# Patient Record
Sex: MALE
[De-identification: names, ages, dates, MRNs, and addresses within clinical notes are randomized per-mention and may not be internally consistent; named-entity substitution may affect disease eponyms.]

---

## 2021-11-02 PROBLEM — Z00.00 ENCOUNTER FOR PREVENTIVE HEALTH EXAMINATION: Status: ACTIVE | Noted: 2021-11-02

## 2021-11-04 ENCOUNTER — APPOINTMENT (OUTPATIENT)
Dept: ORTHOPEDIC SURGERY | Facility: CLINIC | Age: 56
End: 2021-11-04
Payer: COMMERCIAL

## 2021-11-04 VITALS — WEIGHT: 205 LBS | BODY MASS INDEX: 32.95 KG/M2 | HEIGHT: 66 IN

## 2021-11-04 DIAGNOSIS — M25.522 PAIN IN RIGHT ELBOW: ICD-10-CM

## 2021-11-04 DIAGNOSIS — M25.521 PAIN IN RIGHT ELBOW: ICD-10-CM

## 2021-11-04 DIAGNOSIS — M77.8 OTHER ENTHESOPATHIES, NOT ELSEWHERE CLASSIFIED: ICD-10-CM

## 2021-11-04 DIAGNOSIS — M77.10 LATERAL EPICONDYLITIS, UNSPECIFIED ELBOW: ICD-10-CM

## 2021-11-04 PROCEDURE — 73070 X-RAY EXAM OF ELBOW: CPT | Mod: 50

## 2021-11-04 PROCEDURE — 99204 OFFICE O/P NEW MOD 45 MIN: CPT

## 2021-11-04 RX ORDER — NABUMETONE 500 MG/1
500 TABLET, FILM COATED ORAL
Qty: 30 | Refills: 2 | Status: ACTIVE | COMMUNITY
Start: 2021-11-04 | End: 1900-01-01

## 2021-11-04 NOTE — ASSESSMENT
[FreeTextEntry1] : Discussed at length with patient exam history and imaging as well as treatment options.  Discussed on the right counterforce bracing which was given as well his home exercises and avoid lifting with a pronated position.  In regards to the left discussed triceps tendinitis and avoidance of deep triceps extension as well as dips.  Prescription anti-inflammatory given.  Discussed the both of these entities can take several months to resolve and persistent discomfort consideration to injection on the right but not on the left given the nature of triceps tendinitis.  Patient agrees with plan
No

## 2021-11-04 NOTE — HISTORY OF PRESENT ILLNESS
[de-identified] : Location: Bilateral elbows (posterior - left, lateral - right)\par Duration: 4 months\par Context: atraumatic \par Quality: sharp, dull \par Aggravating factors: lifting heavy weights\par Conservative treatment: rest\par Prior studies: N/A

## 2021-11-04 NOTE — PHYSICAL EXAM
[de-identified] : Right elbow\par Constitutional: \par The patient is healthy-appearing and in no apparent distress. \par \par Cardiovascular System: \par There is capillary refill less than 2 seconds. \par \par Skin: \par There is no skin abnormalities of elbow.\par \par Right Elbow: \par Appearance: \par There is no deformity, induration, redness, swelling, or warmth and a normal carrying angle. \par \par Bony Palpation: \par There is no tenderness of the medial epicondyle.\par There is no tenderness of olecranon.\par There is no tenderness of the ulnatrochlea articulation.\par There is no tenderness of the coronoid process.\par There is no tenderness of the radial head.\par There is no tenderness of the radiocapitellar joint.\par There is tenderness of the lateral epicondyle. \par \par Soft Tissue Palpation: \par There is no tenderness of the ulnar nerve.\par There is no tenderness of the biceps insertion.\par There is no tenderness of the pronator teres.\par There is no tenderness of the flexor carpi ulnaris.\par There is no tenderness of the flexor carpi radialis.\par There is no tenderness of the annular ligament of the radius.\par There is no tenderness of the brachioradialis.\par There is no tenderness of the radial collateral ligament.\par There is no tenderness of the ulnar collateral ligament.\par There is no tenderness of the antecubital fossa.\par There is tenderness of the extensor carpi radialis brevis.\par There is tenderness of the extensor carpi radialis longus.\par \par Range of Motion:  \par There is full range of motion both actively and passively. \par \par Stability:\par There is no dislocation or laxity to testing.\par  \par Strength: \par There is 5/5 elbow flexion, extension, supination and pronation.  \par \par Neurologic:\par There is normal sensation C5-T1 to light touch. \par \par Left Elbow: \par Appearance: \par There is no deformity, induration, redness, swelling, or warmth and a normal carrying angle. \par \par Bony Palpation: \par There is no tenderness of the medial epicondyle.\par There is no tenderness of olecranon.\par There is no tenderness of the ulnatrochlea articulation.\par There is no tenderness of the coronoid process.\par There is no tenderness of the radial head.\par There is no tenderness of the radiocapitellar joint.\par There is no tenderness of the lateral epicondyle. \par \par Soft Tissue Palpation: \par There is no tenderness of the ulnar nerve.\par There is no tenderness of the biceps insertion.\par There is no tenderness of the pronator teres.\par There is no tenderness of the flexor carpi ulnaris.\par There is no tenderness of the flexor carpi radialis.\par There is no tenderness of the annular ligament of the radius.\par There is no tenderness of the brachioradialis.\par There is no tenderness of the radial collateral ligament.\par There is no tenderness of the ulnar collateral ligament.\par There is no tenderness of the antecubital fossa.\par There is mild tenderness of the triceps insertion.\par There is no tenderness of the extensor carpi radialis brevis.\par There is no tenderness of the extensor carpi radialis longus.\par \par Range of Motion:  \par There is full range of motion both actively and passively. \par \par Stability:\par There is no dislocation or laxity to testing.\par  \par Strength: \par There is 5/5 elbow flexion, extension, supination and pronation.  \par \par Neurologic:\par There is normal sensation C5-T1 to light touch. \par \par Psychiatric: \par The patient demonstrates a normal mood and affect and is active and alert.\par  [de-identified] : Given patient's reported history and physical examination, x-ray evaluation ( as listed below ) was ordered and performed to aid in diagnosis and treatment of the patient.\par X-ray bilateral elbow.  There is no significant bony / soft tissue abnormality, arthritis, or fracture.\par \par

## 2022-04-13 ENCOUNTER — APPOINTMENT (OUTPATIENT)
Dept: ORTHOPEDIC SURGERY | Facility: CLINIC | Age: 57
End: 2022-04-13

## 2024-06-27 ENCOUNTER — APPOINTMENT (OUTPATIENT)
Dept: ORTHOPEDIC SURGERY | Facility: CLINIC | Age: 59
End: 2024-06-27

## 2024-06-27 DIAGNOSIS — S46.002A UNSPECIFIED INJURY OF MUSCLE(S) AND TENDON(S) OF THE ROTATOR CUFF OF LEFT SHOULDER, INITIAL ENCOUNTER: ICD-10-CM

## 2024-06-27 DIAGNOSIS — M75.42 IMPINGEMENT SYNDROME OF LEFT SHOULDER: ICD-10-CM

## 2024-06-27 PROCEDURE — 20611 DRAIN/INJ JOINT/BURSA W/US: CPT | Mod: LT

## 2024-06-27 PROCEDURE — 99214 OFFICE O/P EST MOD 30 MIN: CPT | Mod: 25

## 2024-06-27 PROCEDURE — 73030 X-RAY EXAM OF SHOULDER: CPT | Mod: LT

## 2024-08-14 ENCOUNTER — APPOINTMENT (OUTPATIENT)
Dept: ORTHOPEDIC SURGERY | Facility: CLINIC | Age: 59
End: 2024-08-14
Payer: COMMERCIAL

## 2024-08-14 DIAGNOSIS — M75.42 IMPINGEMENT SYNDROME OF LEFT SHOULDER: ICD-10-CM

## 2024-08-14 DIAGNOSIS — M12.812 OTHER SPECIFIC ARTHROPATHIES, NOT ELSEWHERE CLASSIFIED, LEFT SHOULDER: ICD-10-CM

## 2024-08-14 DIAGNOSIS — M75.112 INCOMPLETE ROTATOR CUFF TEAR OR RUPTURE OF LEFT SHOULDER, NOT SPECIFIED AS TRAUMATIC: ICD-10-CM

## 2024-08-14 PROCEDURE — 99213 OFFICE O/P EST LOW 20 MIN: CPT

## 2024-08-14 NOTE — HISTORY OF PRESENT ILLNESS
[de-identified] : TELEHEALTH VISIT FOR LEFT SHOUDLER PAIN SINCE JANUARY 2024  KENALOG INJECTION JUNE 2024 - SOME RELIEF - STILL HAS NIGHT PAIN   LOCATION: LEFT SHOULDER PAIN -RHD  DURATION: PAIN STARTED JANUARY 2024 -  5 MONTHS AGO -  NO SPECIFIC INJURY - MAYBE  GYM  QUALITY: SHARP WITH USE , DULL AT NIGHT LAYING ON IT  INTERMITTENT / LOCALIZED  LATERAL SHOUDLER   PAIN LEVEL:3 - 8 /10  TREATMENTS: PATIENT HAS TRIED RESTING AGGRAVATING FACTORS: PAIN WORSENS WITH SHOWERING, OVER HEAD LIFITNG  PAIN WORSE DURING THE  AT NIGHT / SLEEPING PT UNDERWENT SURGERY WITH DR. PYLE IN THE PAST -  2000s

## 2024-08-14 NOTE — DISCUSSION/SUMMARY
[de-identified] : PREOP SHOULDER SURGERY DISCUSSION:  PROCEDURE DISCUSSED - QUESTIONS ANSWERED PATIENT WISHES TO PROCEED  POST OP CARE AND LIMITATIONS REVIEWED - HANDOUT PROVIDED   COLD PACKS RECOMMENDED ANALGESICS AND  ANTI NAUSEA MEDS PRESCRIBED  COLACE RECOMMENDED   THERE ARE NO GUARANTEES THAT ALL SYMPTOMS WILL BE ALLEVIATED   SHOULDER ARTHROSCOPY, ACROMIOPLASTY, DEBRIDEMENT,  RC REPAIR AND LABRUM REPAIRS- ON AVERAGE 75- 85% SATISFACTORY RESULTS FOR TEARS < 3CM AFTER 9-12 MONTHS HEALING AND REHABILITATION.   REPAIRS WILL REQUIRE STRICT SHOULDER IMMOBILIZER 4-6 WEEKS  RC TEARS 3CM OR LARGER MAY REQUIRE COLLAGEN PATCH AUGMENTATION GENERALLY HAVE LESS SATISFACTORY RESULTS  PHYSICAL THERAPY REQUIRED 2X WEEK FOR  MINIMUM 8-12 WEEKS FOR ALL PROCEDURES  CONTINUED HOME EXERCISES 6-9 MONTHS AFTER THAT REQUIRED FOR OPTIMAL OUTCOMES   ROUTINE SURGICAL AND ANESTHETIC RISKS INCLUDE RISK OF SURGICAL INFECTION, ANESTHETIC COMPLICATION OR ALLERGY, POSSIBLE RETEARS OR PROGRESSION OF TEAR, STIFFNESS OF SHOULDER AND UNSATISFACTORY OUTCOMES  PATIENT UNDERSTANDS AND WISHES TO PROCEED

## 2024-08-14 NOTE — PHYSICAL EXAM
[de-identified] : PHYSICAL EXAM LEFT  SHOULDER   MILD  PROTRACTION AROM 140 / 140 / 80 / 15 TENDER: SA REGION LATERAL   SPECIAL TESTING : ASHTON - POSITIVE  CHARLENE - POSITIVE  SPEED TEST - POSITIVE  PIPER - NEGATIVE  APPREHENSION AND SUPPRESSION - NEGATIVE   RC STRENGTH TESTING  SS:  5/5 SUB 5/5 IS     5/5 BICEPS  5/5  SENSATION  - GROSSLY INTACT   [de-identified] : Date of Exam: 07-   EXAM:  MRI LEFT SHOULDER WITHOUT CONTRAST    IMPRESSION:    1. Severe AC joint arthrosis. 2. Mildly laterally and anteriorly downwardly hooked acromial shape, small to moderate subacromial enthesophyte further accentuating the acromial rim. The underlying bursa is mildly thickened and inflamed. 3. Study negative for full-thickness rotator cuff tear. There is mild to moderate tendinosis in the supraspinatus and infraspinatus tendons, small intrasubstance tear undermining a small portion of the supraspinatus attachment. No rotator cuff muscle atrophy. 4. Possible mild bidirectional instability, with humeral head is subtly posteriorly subluxed with respect to the glenoid (on these images obtained with patient in the supine position). 5. Type IIa SLAP tear, with elongated multilocular perimeniscal cyst arising from the sublabral foramen, tracking beneath the coracoid. There also appears to be additional subtle tearing of the anterior-inferior labrum (subtle GLAD lesion).

## 2024-09-26 ENCOUNTER — APPOINTMENT (OUTPATIENT)
Dept: ORTHOPEDIC SURGERY | Facility: CLINIC | Age: 59
End: 2024-09-26
Payer: COMMERCIAL

## 2024-09-26 PROCEDURE — 99213 OFFICE O/P EST LOW 20 MIN: CPT

## 2024-09-26 RX ORDER — OXYCODONE AND ACETAMINOPHEN 7.5; 325 MG/1; MG/1
7.5-325 TABLET ORAL
Qty: 42 | Refills: 0 | Status: ACTIVE | COMMUNITY
Start: 2024-09-26 | End: 1900-01-01

## 2024-09-26 NOTE — DISCUSSION/SUMMARY
[de-identified] : PREOP SHOULDER SURGERY DISCUSSION:  PROCEDURE DISCUSSED - QUESTIONS ANSWERED PATIENT WISHES TO PROCEED  POST OP CARE AND LIMITATIONS REVIEWED - HANDOUT PROVIDED   COLD PACKS RECOMMENDED ANALGESICS AND  ANTI NAUSEA MEDS PRESCRIBED  COLACE RECOMMENDED   THERE ARE NO GUARANTEES THAT ALL SYMPTOMS WILL BE ALLEVIATED   SHOULDER ARTHROSCOPY, ACROMIOPLASTY, DEBRIDEMENT,  RC REPAIR AND LABRUM REPAIRS- ON AVERAGE 75- 85% SATISFACTORY RESULTS FOR TEARS < 3CM AFTER 9-12 MONTHS HEALING AND REHABILITATION.   REPAIRS WILL REQUIRE STRICT SHOULDER IMMOBILIZER 4-6 WEEKS  RC TEARS 3CM OR LARGER MAY REQUIRE COLLAGEN PATCH AUGMENTATION GENERALLY HAVE LESS SATISFACTORY RESULTS  PHYSICAL THERAPY REQUIRED 2X WEEK FOR  MINIMUM 8-12 WEEKS FOR ALL PROCEDURES  CONTINUED HOME EXERCISES 6-9 MONTHS AFTER THAT REQUIRED FOR OPTIMAL OUTCOMES   ROUTINE SURGICAL AND ANESTHETIC RISKS INCLUDE RISK OF SURGICAL INFECTION, ANESTHETIC COMPLICATION OR ALLERGY, POSSIBLE RETEARS OR PROGRESSION OF TEAR, STIFFNESS OF SHOULDER AND UNSATISFACTORY OUTCOMES  PATIENT UNDERSTANDS AND WISHES TO PROCEED

## 2024-09-26 NOTE — PHYSICAL EXAM
[de-identified] : PHYSICAL EXAM LEFT  SHOULDER   MILD  PROTRACTION AROM 140 / 140 / 80 / 15 TENDER: SA REGION LATERAL   SPECIAL TESTING : ASHTON - POSITIVE  CHARLENE - POSITIVE  SPEED TEST - POSITIVE  PIPER - NEGATIVE  APPREHENSION AND SUPPRESSION - NEGATIVE   RC STRENGTH TESTING  SS:  5/5 SUB 5/5 IS     5/5 BICEPS  5/5  SENSATION  - GROSSLY INTACT

## 2024-09-26 NOTE — HISTORY OF PRESENT ILLNESS
[de-identified] : LEFT SHOULDER PAIN  FOLLOWUP  OCTOBER 1, 2024- LEFT DELROY, DEBRIDEMENT, POSSIBLE REPAIR SMALL PARTIAL TEAR, SYNOVECTOMY  ICE AND MEDICATIOON AS NEEDED    PREVIOUS [facility] TELEHEALTH VISIT FOR LEFT SHOUDLER PAIN SINCE JANUARY 2024  KENALOG INJECTION JUNE 2024 - SOME RELIEF - STILL HAS NIGHT PAIN   LOCATION: LEFT SHOULDER PAIN -RHD  DURATION: PAIN STARTED JANUARY 2024 -  5 MONTHS AGO -  NO SPECIFIC INJURY - MAYBE  GYM  QUALITY: SHARP WITH USE , DULL AT NIGHT LAYING ON IT  INTERMITTENT / LOCALIZED  LATERAL SHOUDLER   PAIN LEVEL:3 - 8 /10  TREATMENTS: PATIENT HAS TRIED RESTING AGGRAVATING FACTORS: PAIN WORSENS WITH SHOWERING, OVER HEAD LIFITNG  PAIN WORSE DURING THE  AT NIGHT / SLEEPING PT UNDERWENT SURGERY WITH DR. PYLE IN THE PAST -  2000s

## 2024-10-01 ENCOUNTER — APPOINTMENT (OUTPATIENT)
Age: 59
End: 2024-10-01

## 2024-10-01 PROCEDURE — 29820 SHO ARTHRS SRG PRTL SYNVCT: CPT | Mod: LT,59

## 2024-10-01 PROCEDURE — 29823 SHO ARTHRS SRG XTNSV DBRDMT: CPT | Mod: LT,59

## 2024-10-01 PROCEDURE — 29826 SHO ARTHRS SRG DECOMPRESSION: CPT | Mod: LT

## 2024-10-04 ENCOUNTER — APPOINTMENT (OUTPATIENT)
Dept: ORTHOPEDIC SURGERY | Facility: CLINIC | Age: 59
End: 2024-10-04

## 2024-10-04 DIAGNOSIS — M75.112 INCOMPLETE ROTATOR CUFF TEAR OR RUPTURE OF LEFT SHOULDER, NOT SPECIFIED AS TRAUMATIC: ICD-10-CM

## 2024-10-04 DIAGNOSIS — M75.42 IMPINGEMENT SYNDROME OF LEFT SHOULDER: ICD-10-CM

## 2024-10-04 PROCEDURE — 73030 X-RAY EXAM OF SHOULDER: CPT | Mod: LT

## 2024-10-04 RX ORDER — IBUPROFEN 800 MG/1
800 TABLET ORAL 3 TIMES DAILY
Qty: 90 | Refills: 3 | Status: ACTIVE | COMMUNITY
Start: 2024-10-04 | End: 1900-01-01

## 2024-10-04 RX ORDER — ONDANSETRON 8 MG/1
8 TABLET, ORALLY DISINTEGRATING ORAL 3 TIMES DAILY
Qty: 21 | Refills: 1 | Status: ACTIVE | COMMUNITY
Start: 2024-09-26 | End: 1900-01-01

## 2024-10-04 NOTE — DISCUSSION/SUMMARY
[de-identified] : POST OP DELROY   COLD TX, ANALGESICS PRN,,WOUND CARE REVIEWED  START PENDULUM TID - PROGRESS TO AAROM 48 HOURS  START PT WITHIN 7-14 DAYS  START CARDIO 2 WEEKS PRN  AVOID GYM, HEAVY LIFTING 6-12 WEEKS   FU 6 WEEKS

## 2024-10-04 NOTE — HISTORY OF PRESENT ILLNESS
[de-identified] : LEFT SHOULDER PAIN  POST OP FOLLOWUP  OCTOBER 1, 2024- LEFT DELROY, DEBRIDEMENT GRADE 1 SS SYNOVECTOMY     PREVIOUS [facility] TELEHEALTH VISIT FOR LEFT SHOUDLER PAIN SINCE JANUARY 2024  KENALOG INJECTION JUNE 2024 - SOME RELIEF - STILL HAS NIGHT PAIN   LOCATION: LEFT SHOULDER PAIN -RHD  DURATION: PAIN STARTED JANUARY 2024 -  5 MONTHS AGO -  NO SPECIFIC INJURY - MAYBE  GYM  QUALITY: SHARP WITH USE , DULL AT NIGHT LAYING ON IT  INTERMITTENT / LOCALIZED  LATERAL SHOUDLER   PAIN LEVEL:3 - 8 /10  TREATMENTS: PATIENT HAS TRIED RESTING AGGRAVATING FACTORS: PAIN WORSENS WITH SHOWERING, OVER HEAD LIFITNG  PAIN WORSE DURING THE  AT NIGHT / SLEEPING PT UNDERWENT SURGERY WITH DR. PYLE IN THE PAST -  2000s

## 2024-10-04 NOTE — PHYSICAL EXAM
[de-identified] : POST OP SHOULDER EXAM   PORTALS HEALING WELL - NO ERYTHEMA OR CALOR SUTURES REMOVED, STERISTRIPS AND WATERPROOF BANDAIDS  APPLIED   AROM  PENDULUM - AAROM 90   DISTAL CMS INTACT

## 2024-11-29 ENCOUNTER — APPOINTMENT (OUTPATIENT)
Dept: ORTHOPEDIC SURGERY | Facility: CLINIC | Age: 59
End: 2024-11-29

## 2024-12-20 ENCOUNTER — APPOINTMENT (OUTPATIENT)
Dept: ORTHOPEDIC SURGERY | Facility: CLINIC | Age: 59
End: 2024-12-20
Payer: COMMERCIAL

## 2024-12-20 DIAGNOSIS — M75.42 IMPINGEMENT SYNDROME OF LEFT SHOULDER: ICD-10-CM

## 2024-12-20 DIAGNOSIS — M75.112 INCOMPLETE ROTATOR CUFF TEAR OR RUPTURE OF LEFT SHOULDER, NOT SPECIFIED AS TRAUMATIC: ICD-10-CM

## 2024-12-20 PROCEDURE — 99024 POSTOP FOLLOW-UP VISIT: CPT

## 2025-02-20 ENCOUNTER — APPOINTMENT (OUTPATIENT)
Dept: ORTHOPEDIC SURGERY | Facility: CLINIC | Age: 60
End: 2025-02-20
Payer: COMMERCIAL

## 2025-02-20 DIAGNOSIS — M75.112 INCOMPLETE ROTATOR CUFF TEAR OR RUPTURE OF LEFT SHOULDER, NOT SPECIFIED AS TRAUMATIC: ICD-10-CM

## 2025-02-20 DIAGNOSIS — M70.62 TROCHANTERIC BURSITIS, LEFT HIP: ICD-10-CM

## 2025-02-20 PROCEDURE — 99213 OFFICE O/P EST LOW 20 MIN: CPT
